# Patient Record
Sex: FEMALE | Employment: FULL TIME | URBAN - METROPOLITAN AREA
[De-identification: names, ages, dates, MRNs, and addresses within clinical notes are randomized per-mention and may not be internally consistent; named-entity substitution may affect disease eponyms.]

---

## 2019-10-04 ENCOUNTER — SEE NOTE (OUTPATIENT)
Dept: URBAN - METROPOLITAN AREA CLINIC 31 | Facility: CLINIC | Age: 44
Setting detail: DERMATOLOGY
End: 2019-10-04

## 2019-10-04 DIAGNOSIS — L23.0 ALLERGIC CONTACT DERMATITIS DUE TO METALS: ICD-10-CM

## 2019-10-04 DIAGNOSIS — B00.9 HERPESVIRAL INFECTION, UNSPECIFIED: ICD-10-CM

## 2019-10-04 PROCEDURE — 99202 OFFICE O/P NEW SF 15 MIN: CPT

## 2019-10-08 ENCOUNTER — RX ONLY (RX ONLY)
Age: 44
End: 2019-10-08

## 2019-10-08 RX ORDER — HYDROQUINONE 40 MG/G
1 APPLICATION CREAM TOPICAL NIGHTLY
Qty: 30 | Refills: 0
Start: 2019-10-08

## 2023-04-07 ENCOUNTER — OFFICE VISIT (OUTPATIENT)
Dept: URBAN - METROPOLITAN AREA SURGERY CENTER 16 | Facility: SURGERY CENTER | Age: 48
End: 2023-04-07
Payer: COMMERCIAL

## 2023-04-07 DIAGNOSIS — Z12.11 AVERAGE RISK FOR CRC. DUE TO PT'S CO-MORBID STATE WITH END STAGE DEMENTIA, HIGH RISK FOR ANESTHESIA (PER NEUROLOGY); INABILITY TO TAKE A BOWEL PREP....WOULD NOT ADVISE ANY COLORECTAL CANCER SCREENING INCLUDING STOOL TEST FOR FECAL BLOOD.: ICD-10-CM

## 2023-04-07 DIAGNOSIS — K63.89 APPENDICITIS EPIPLOICA: ICD-10-CM

## 2023-04-07 PROCEDURE — 45385 COLONOSCOPY W/LESION REMOVAL: CPT | Performed by: INTERNAL MEDICINE

## 2023-04-07 PROCEDURE — G8907 PT DOC NO EVENTS ON DISCHARG: HCPCS | Performed by: INTERNAL MEDICINE

## 2025-02-05 ENCOUNTER — OFFICE VISIT (OUTPATIENT)
Dept: URBAN - METROPOLITAN AREA CLINIC 105 | Facility: CLINIC | Age: 50
End: 2025-02-05
Payer: COMMERCIAL

## 2025-02-05 ENCOUNTER — DASHBOARD ENCOUNTERS (OUTPATIENT)
Age: 50
End: 2025-02-05

## 2025-02-05 VITALS
HEART RATE: 76 BPM | SYSTOLIC BLOOD PRESSURE: 139 MMHG | WEIGHT: 181 LBS | HEIGHT: 67 IN | DIASTOLIC BLOOD PRESSURE: 86 MMHG | TEMPERATURE: 97.6 F | BODY MASS INDEX: 28.41 KG/M2

## 2025-02-05 DIAGNOSIS — R14.2 BELCHING: ICD-10-CM

## 2025-02-05 DIAGNOSIS — R14.0 ABDOMINAL BLOATING: ICD-10-CM

## 2025-02-05 DIAGNOSIS — R10.33 ABDOMINAL PAIN, ACUTE, PERIUMBILICAL: ICD-10-CM

## 2025-02-05 PROCEDURE — 99204 OFFICE O/P NEW MOD 45 MIN: CPT | Performed by: INTERNAL MEDICINE

## 2025-02-05 PROCEDURE — 99214 OFFICE O/P EST MOD 30 MIN: CPT | Performed by: INTERNAL MEDICINE

## 2025-02-05 RX ORDER — PANTOPRAZOLE SODIUM 40 MG/1
1 TABLET 1/2 TO 1 HOUR BEFORE MORNING MEAL TABLET, DELAYED RELEASE ORAL ONCE A DAY
Qty: 90 TABLET | Refills: 0 | OUTPATIENT
Start: 2025-02-05

## 2025-02-05 NOTE — PHYSICAL EXAM GASTROINTESTINAL
Abdomen soft, tender superior to umbilicus, nondistended, no guarding or rigidity, no masses palpable, normal bowel sounds Liver and Spleen no hepatosplenomegaly Rectal deferred

## 2025-02-05 NOTE — HPI-TODAY'S VISIT:
49-year-old female with PMH of HTN, presenting for abdominal pain. She is s/p DAC in 2023 with 10 yr recall. Pt states every time she eats, she experiences abdominal pain. - around or above the belly button. She feels full quickly. She will feel bloated. Started about 1 week ago. Denies N/V, dysphagia, heartburn, regurgitation, change in BMs, rectal bleeding or melena. She has noticed more belching. She spoke with nutritionist who suggested it might be because of eating more vegetables and beans. This has been for the last month. She started taking sea saldana recently, otherwise no new medications or supplements. Does not take NSAIDs regularly, but has been taking some Aleve for her back. Last took last week but hasn't taken any this week. She did take some Pepto Bismol once last week. No known family history of GI cancers.

## 2025-02-06 LAB
A/G RATIO: 1.4
ABSOLUTE BASOPHILS: 51
ABSOLUTE EOSINOPHILS: 340
ABSOLUTE LYMPHOCYTES: 3094
ABSOLUTE MONOCYTES: 680
ABSOLUTE NEUTROPHILS: 4335
ALBUMIN: 4
ALKALINE PHOSPHATASE: 71
ALT (SGPT): 11
AST (SGOT): 14
BASOPHILS: 0.6
BILIRUBIN, TOTAL: 0.5
BUN/CREATININE RATIO: (no result)
BUN: 15
CALCIUM: 9.1
CARBON DIOXIDE, TOTAL: 28
CHLORIDE: 102
CREATININE: 0.99
EGFR: 69
EOSINOPHILS: 4
GLOBULIN, TOTAL: 2.9
GLUCOSE: 87
HEMATOCRIT: 36.7
HEMOGLOBIN: 12.2
LIPASE: 26
LYMPHOCYTES: 36.4
MCH: 29.3
MCHC: 33.2
MCV: 88
MONOCYTES: 8
MPV: 10.8
NEUTROPHILS: 51
PLATELET COUNT: 367
POTASSIUM: 4
PROTEIN, TOTAL: 6.9
RDW: 13.6
RED BLOOD CELL COUNT: 4.17
SODIUM: 137
WHITE BLOOD CELL COUNT: 8.5

## 2025-02-07 ENCOUNTER — TELEPHONE ENCOUNTER (OUTPATIENT)
Dept: URBAN - METROPOLITAN AREA CLINIC 105 | Facility: CLINIC | Age: 50
End: 2025-02-07

## 2025-02-10 LAB — H PYLORI BREATH TEST: NOT DETECTED

## 2025-03-05 ENCOUNTER — OFFICE VISIT (OUTPATIENT)
Dept: URBAN - METROPOLITAN AREA CLINIC 105 | Facility: CLINIC | Age: 50
End: 2025-03-05